# Patient Record
Sex: FEMALE | Race: BLACK OR AFRICAN AMERICAN | Employment: OTHER | URBAN - METROPOLITAN AREA
[De-identification: names, ages, dates, MRNs, and addresses within clinical notes are randomized per-mention and may not be internally consistent; named-entity substitution may affect disease eponyms.]

---

## 2022-10-07 ENCOUNTER — HOSPITAL ENCOUNTER (OUTPATIENT)
Dept: PHYSICAL THERAPY | Age: 77
Discharge: HOME | End: 2022-10-07
Payer: MEDICARE

## 2022-10-07 PROCEDURE — 97110 THERAPEUTIC EXERCISES: CPT

## 2022-10-07 PROCEDURE — 97161 PT EVAL LOW COMPLEX 20 MIN: CPT

## 2022-10-07 PROCEDURE — 97140 MANUAL THERAPY 1/> REGIONS: CPT

## 2022-10-07 NOTE — PROGRESS NOTES
[facility] at [address] [facility] [address] [facility], [facility][note 3] SOn license of [facility]  Phone: [phone]      Fax:  [phone]    Plan of Care/ Statement of Necessity for Physical Therapy Services  Patient name: Jose Carlos Dyer Start of Care: 10/7/2022   Referral source: Ambrose De La O MD : 1945    Medical Diagnosis: Left hand pain [M79.642]  Payor: [place] MEDICARE / Plan: [place] MEDICARE PART A & B / Product Type: Medicare /  Onset Date:22    Treatment Diagnosis: Left Hand Pain   Prior Hospitalization: see medical history Provider#: [id number]   Medications: Verified on Patient summary List   Comorbidities: Depression, OA, HTN  Prior Level of Function: Able to use the left arm with no trouble or pain         The Plan of Care and following information is based on the information from the initial evaluation. Assessment/ key information: Patient is a 68 y.o. female referred to PT with the above Dx. Patient seen today s/p left humerus fracture resulting in a left reverse total shoulder replacement on 22. Currently 8 weeks post op       Patient presents to PT with decreased strength, decreased flexibility, and decreased mobility of the left shoulder. Patient s/s appear to be consistent w/ diagnosis. Patient demonstrates the potential to make functional gains within a reasonable time frame and will benefit from skilled PT to address impairments and improve functional mobility and strength for an improved quality of life.   Fall Risk Assessment: Patient demonstrates a low Fall Risk       Evaluation Complexity History MEDIUM  Complexity : 1-2 comorbidities / personal factors will impact the outcome/ POC ; Examination LOW Complexity : 1-2 Standardized tests and measures addressing body structure, function, activity limitation and / or participation in recreation  ;Presentation LOW Complexity : Stable, uncomplicated  ;Clinical Decision Making MEDIUM Complexity : FOTO score of 26-74  Overall Complexity Rating: LOW   Problem List: pain affecting function, decrease ROM, decrease strength, decrease ADL/ functional abilitiies, decrease activity tolerance, decrease flexibility/ joint mobility, decrease transfer abilities, and other FOTO = 34    Treatment Plan may include any combination of the following: Therapeutic exercise, Therapeutic activities, Neuromuscular re-education, Physical agent/modality, Manual therapy, Patient education, Self Care training, Functional mobility training, and Home safety training  Patient / Family readiness to learn indicated by: asking questions, trying to perform skills, and interest  Persons(s) to be included in education: patient (P)  Barriers to Learning/Limitations: None  Patient Goal (s): To get better mobility  Patient Self Reported Health Status: good  Rehabilitation Potential: good    Short Term Goals: To be accomplished in 5 treatments:  1. Pt will be compliant and independent with HEP in order to facilitate PT sessions and aid with self management   Eval Status:  Initiated   Current Status:  2. Pt to tolerate 30 min or more of TE and/or Interventions w/o increased s/s   Eval Status:  Initiated   Current Status:    Long Term Goals: To be accomplished in 10 treatments:  1. Pt will report 50% improvement or better with left shoulder dysfunction to show a significant increase in ability to tolerate ADL   Eval Status:  Initiated   Current Status:  2. Pt will demonstrate PROM left shoulder Flx/ABD to 120* for good progress towards AAROM with little dificulty    Eval Status:  Flx 58 ABD 45   Current Status:  3. Pt will demonstrate AAROM with finger ladder to 120* for carryover to reaching her head to allow her to fix her hair with little difficulty   Eval Status:  Not able to fix her hair   Current Status:  4.   Pt will report the ability to pull up her pants with little difficulty to return to PLOF     Eval Status:  Hard to pull up her pants without assistance   Current Status:  5. Pt will improve FOTO score to 57 to show significant improvement for progress to good left shoulder function   Eval Status: FOTO = 34   Current Status:   Frequency / Duration: Patient to be seen 2-3 times per week for 10 treatments. Patient/ Caregiver education and instruction: Diagnosis, prognosis, self care, activity modification, and exercises   [x]  Plan of care has been reviewed with PTA    Certification Period: 10/7/22 - 11/5/22  Shay Monte, PT 10/7/2022 7:18 AM  _____________________________________________________________________  I certify that the above Therapy Services are being furnished while the patient is under my care. I agree with the treatment plan and certify that this therapy is necessary.     [de-identified] Signature:____________Date:_________TIME:________     Adam Lawrence MD  ** Signature, Date and Time must be completed for valid certification **    Please sign and return to [facility] at [address] [facility].[place] [address] [place] [address], [facility] [address]  Phone: [phone]      Fax:  [phone]

## 2022-10-07 NOTE — PROGRESS NOTES
PT DAILY TREATMENT NOTE/SHOULDER EVAL     Patient Name: Gina Chavarria  Date:10/7/2022  : 1945  [x]  Patient  Verified  Payor: VA MEDICARE / Plan: VA MEDICARE PART A & B / Product Type: Medicare /    In time:1115  Out time:1200  Total Treatment Time (min): 45  Visit #: 1 of 10    Medicare/BCBS Only   Total Timed Codes (min):  25 1:1 Treatment Time:  45       Treatment Area: Left hand pain [M79.642]      SUBJECTIVE  Pain Level (0-10 scale): 2  []constant []intermittent []improving []worsening []no change since onset     Any medication changes, allergies to medications, adverse drug reactions, diagnosis change, or new procedure performed?: [x] No    [] Yes (see summary sheet for update)  Subjective functional status/changes:       Subjective: Patient is a 68 y.o. female referred to PT with the above Dx. Patient seen today s/p left humerus fracture resulting in a left reverse total shoulder replacement on 22. Currently 8 weeks post op      Patient presents to PT with decreased strength, decreased flexibility, and decreased mobility of the left shoulder. Patient s/s appear to be consistent w/ diagnosis. Patient demonstrates the potential to make functional gains within a reasonable time frame and will benefit from skilled PT to address impairments and improve functional mobility and strength for an improved quality of life. Fall Risk Assessment: Patient demonstrates a low Fall Risk      Mechanism of Injury: Fall and fracture left proximal humerus, 22    Comorbidities: Depression, OA, HTN  Prior Level of Function: Able to use the left arm with no trouble or pain    FOTO: 34 / 57 in 13 visits    Goal: To get better mobility    Health Status: Good      What type of work do you do? N/A    Living Situation/Domestic Life: Lives at home with  and son  Mobility: Mod (I)  Self Care Mod (I)    What type of daily activities/hobbies?  House work, take care of     Limitations to Activity/Recreation/PLOF: Hard to pull up pants, not able to cook         Barriers: []pain []financial []time []transportation []other    Motivation: High    FABQ Score: []low []elevate    Cognition: A & O x 3    Other:    Risk For Falls:   [x]No  []low []elevate           OBJECTIVE/EXAMINATION  Posture: Fwd Head and Fwd Shoulders, Increased Kyphosis,   - Decreased left shoulder mobility/function  - Pain at end range  - TTP with muscle tightness left pec/infraspinatus         AROM PROM Strength Pain? ?    Right Left Right Left Right Left    Cx Rot          Cx SB          Cx Flx          Cx Ext          Shoulder Flx WNL 58        Shoulder Ext WNL 52        Shoulder ABD WNL 45        Shoulder ADD WNL NT        Shoulder IR          Shoulder ER  27                        20 min [x]Eval                  []Re-Eval         15 min Therapeutic Exercise:  [x] See flow sheet : Develop and instruct HEP   Rationale: increase ROM, increase strength, and improve coordination to improve the patients ability to Initiate HEP and improve daily function     10 min Manual Therapy:  PROM with slight overstretch left shoulder Flx/ABD, STM/DTM Left Pec/infraspinatus   The manual therapy interventions were performed at a separate and distinct time from the therapeutic activities interventions. Rationale: decrease pain, increase ROM, increase tissue extensibility, and decrease trigger points to improve daily function                                                                   With   [x] TE   [] TA   [] neuro   [] other: Patient Education: [x] Review HEP    [] Progressed/Changed HEP based on:   [] positioning   [] body mechanics   [] transfers   [] heat/ice application    [] other:       Other Objective/Functional Measures:      Access Code: [email or web address]  URL: [email or web address]  Date: 10/07/2022  Prepared by: Josh Park    Exercises  Seated Straight Fist AROM - 2 x daily - 7 x weekly - 3 sets - 10 reps  Seated Wrist Extension AROM - 2 x daily - 7 x weekly - 3 sets - 10 reps  Seated Forearm Pronation and Supination AROM - 2 x daily - 7 x weekly - 3 sets - 10 reps  Seated Elbow Flexion and Extension AROM - 2 x daily - 7 x weekly - 3 sets - 10 reps  Seated Cervical Sidebending AROM - 2 x daily - 7 x weekly - 10 reps  Seated Scapular Retraction - 2 x daily - 7 x weekly - 3 sets - 10 reps  Seated Shoulder Shrugs - 2 x daily - 7 x weekly - 3 sets - 10 reps  Seated Shoulder Shrug Circles AROM Backward - 2 x daily - 7 x weekly - 3 sets - 10 reps    Pain Level (0-10 scale) post treatment: 2     ASSESSMENT/Changes in Function:    - Pt demo understanding of the HEP      [x]  See Plan of Care  []  See progress note/recertification  []  See Discharge Summary         Progress towards goals / Updated goals:  Short Term Goals: To be accomplished in 5 treatments:  1. Pt will be compliant and independent with HEP in order to facilitate PT sessions and aid with self management   Eval Status:  Initiated   Current Status:  2. Pt to tolerate 30 min or more of TE and/or Interventions w/o increased s/s   Eval Status:  Initiated   Current Status:    Long Term Goals: To be accomplished in 10 treatments:  1. Pt will report 50% improvement or better with left shoulder dysfunction to show a significant increase in ability to tolerate ADL   Eval Status:  Initiated   Current Status:  2. Pt will demonstrate PROM left shoulder Flx/ABD to 120* for good progress towards AAROM with little dificulty    Eval Status:  Flx 58 ABD 45   Current Status:  3. Pt will demonstrate AAROM with finger ladder to 120* for carryover to reaching her head to allow her to fix her hair with little difficulty   Eval Status:  Not able to fix her hair   Current Status:  4. Pt will report the ability to pull up her pants with little difficulty to return to PLOF     Eval Status:  Hard to pull up her pants without assistance   Current Status:  5.   Pt will improve FOTO score to 57 to show significant improvement for progress to good left shoulder function   Eval Status: FOTO = 34   Current Status:      PLAN  [x]  Upgrade activities as tolerated     [x]  Continue plan of care  []  Update interventions per flow sheet       []  Discharge due to:_  []  Other:_       Cesar Alcantara, PT 10/7/2022  7:20 AM

## 2022-10-11 ENCOUNTER — HOSPITAL ENCOUNTER (OUTPATIENT)
Dept: PHYSICAL THERAPY | Age: 77
Discharge: HOME | End: 2022-10-11
Payer: MEDICARE

## 2022-10-11 PROCEDURE — 97140 MANUAL THERAPY 1/> REGIONS: CPT

## 2022-10-11 PROCEDURE — 97530 THERAPEUTIC ACTIVITIES: CPT

## 2022-10-11 PROCEDURE — 97110 THERAPEUTIC EXERCISES: CPT

## 2022-10-11 NOTE — PROGRESS NOTES
PT DAILY TREATMENT NOTE     Patient Name: Aundrea Kat  Date:10/11/2022  : 1945  [x]  Patient  Verified  Payor: VA MEDICARE / Plan: VA MEDICARE PART A & B / Product Type: Medicare /    In time:929  Out time:1020  Total Treatment Time (min): 51  Visit #: 2 of 10    Medicare/BCBS Only   Total Timed Codes (min):  41 1:1 Treatment Time:  41       Treatment Area: Left hand pain [M79.362]    SUBJECTIVE  Pain Level (0-10 scale): 4/10  Any medication changes, allergies to medications, adverse drug reactions, diagnosis change, or new procedure performed?: [x] No    [] Yes (see summary sheet for update)  Subjective functional status/changes:   [] No changes reported  Reports increased \"I think its the weather\"    OBJECTIVE    Modality rationale: decrease inflammation and decrease pain to improve the patients ability to perform ADLs   Min Type Additional Details    [] Estim:  []Unatt       []IFC  []Premod                        []Other:  []w/ice   []w/heat  Position:  Location:    [] Estim: []Att    []TENS instruct  []NMES                    []Other:  []w/US   []w/ice   []w/heat  Position:  Location:    []  Traction: [] Cervical       []Lumbar                       [] Prone          []Supine                       []Intermittent   []Continuous Lbs:  [] before manual  [] after manual    []  Ultrasound: []Continuous   [] Pulsed                           []1MHz   []3MHz W/cm2:  Location:    []  Iontophoresis with dexamethasone         Location: [] Take home patch   [] In clinic   10 []  Ice     [x]  heat  []  Ice massage  []  Laser   []  Anodyne Position: Long sitting   Location: left shoulder     []  Laser with stim  []  Other:  Position:  Location:    []  Vasopneumatic Device    []  Right     []  Left  Pre-treatment girth:  Post-treatment girth:  Measured at (location):  Pressure:       [] lo [] med [] hi   Temperature: [] lo [] med [] hi   [x] Skin assessment post-treatment:  [x]intact []redness- no adverse reaction    []redness - adverse reaction:         12 min Therapeutic Exercise:  [] See flow sheet :   Rationale: increase ROM and increase strength to improve the patients ability to perform ADLs    14 min Therapeutic Activity:  []  See flow sheet :   Rationale: increase ROM, increase strength, and improve coordination  to improve the patients ability to perform ADLs       15 min Manual Therapy:  STM/TPR post delt/triceps, gentle PROM, densensitization rub    The manual therapy interventions were performed at a separate and distinct time from the therapeutic activities interventions. Rationale: decrease pain, increase ROM, increase tissue extensibility, and decrease trigger points to perform ADLs          With   [x] TE   [] TA   [] neuro   [] other: Patient Education: [x] Review HEP    [x] Progressed/Changed HEP based on:   [] positioning   [] body mechanics   [] transfers   [] heat/ice application    [x] other: Densensitization  rub      Other Objective/Functional Measures:   - initiate TE per flow sheet      Pain Level (0-10 scale) post treatment: 2/10    ASSESSMENT/Changes in Function: patient actively participates in therapy. Decreased activity tolerance d/t pain. However, reports decreased pain after MT     Patient will continue to benefit from skilled PT services to modify and progress therapeutic interventions, address functional mobility deficits, address ROM deficits, address strength deficits, analyze and address soft tissue restrictions, and analyze and cue movement patterns to attain remaining goals. []  See Plan of Care  []  See progress note/recertification  []  See Discharge Summary         Progress towards goals / Updated goals:  Short Term Goals: To be accomplished in 5 treatments:  1.   Pt will be compliant and independent with HEP in order to facilitate PT sessions and aid with self management             Eval Status:  Initiated             Current Status: Met, patient compliant with HEP 10/11/22  2. Pt to tolerate 30 min or more of TE and/or Interventions w/o increased s/s             Eval Status:  Initiated             Current Status:     Long Term Goals: To be accomplished in 10 treatments:  1. Pt will report 50% improvement or better with left shoulder dysfunction to show a significant increase in ability to tolerate ADL             Eval Status:  Initiated             Current Status:  2. Pt will demonstrate PROM left shoulder Flx/ABD to 120* for good progress towards AAROM with little dificulty              Eval Status:  Flx 58 ABD 45             Current Status:  3. Pt will demonstrate AAROM with finger ladder to 120* for carryover to reaching her head to allow her to fix her hair with little difficulty             Eval Status:  Not able to fix her hair             Current Status:  4. Pt will report the ability to pull up her pants with little difficulty to return to PLOF               Eval Status:  Hard to pull up her pants without assistance             Current Status:  5.   Pt will improve FOTO score to 57 to show significant improvement for progress to good left shoulder function             Eval Status: FOTO = 34             Current Status:     PLAN  [x]  Upgrade activities as tolerated     [x]  Continue plan of care  []  Update interventions per flow sheet       []  Discharge due to:_  []  Other:_      Hansa Lay PTA 10/11/2022  9:33 AM    Future Appointments   Date Time Provider Pierce Wahl   10/14/2022  2:45 PM Radha Clay PTA [facility]   10/17/2022 12:30 PM Radha Clay PTA [facility]   10/19/2022 11:00 AM Radha Clay PTA [facility]   10/21/2022 10:15 AM Radha Clay PTA [facility]   10/24/2022 11:00 AM Radha Clay PTA [facility]   10/26/2022 10:15 AM Sarah Xavier, PT [facility]   10/28/2022  8:00 AM Sarah Xavier PT [facility]   10/31/2022  3:30 PM Sarah Xavier PT [facility]   11/2/2022 11:45 AM Sarah Xavier, PT [id number] [facility]

## 2022-10-14 ENCOUNTER — APPOINTMENT (OUTPATIENT)
Dept: PHYSICAL THERAPY | Age: 77
End: 2022-10-14
Payer: MEDICARE

## 2022-10-17 ENCOUNTER — HOSPITAL ENCOUNTER (OUTPATIENT)
Dept: PHYSICAL THERAPY | Age: 77
Discharge: HOME | End: 2022-10-17
Payer: MEDICARE

## 2022-10-17 PROCEDURE — 97140 MANUAL THERAPY 1/> REGIONS: CPT

## 2022-10-17 PROCEDURE — 97530 THERAPEUTIC ACTIVITIES: CPT

## 2022-10-17 PROCEDURE — 97110 THERAPEUTIC EXERCISES: CPT

## 2022-10-17 NOTE — PROGRESS NOTES
PT DAILY TREATMENT NOTE     Patient Name: Betito Bragg  Date:10/17/2022  : 1945  [x]  Patient  Verified  Payor: [place] MEDICARE / Plan: [place] MEDICARE PART A & B / Product Type: Medicare /    In time:1228  Out time:1226  Total Treatment Time (min): 58  Visit #: 3 of 10    Medicare/BCBS Only   Total Timed Codes (min):  48 1:1 Treatment Time:  48       Treatment Area: Left hand pain [M79.642]    SUBJECTIVE  Pain Level (0-10 scale): 2/10  Any medication changes, allergies to medications, adverse drug reactions, diagnosis change, or new procedure performed?: [x] No    [] Yes (see summary sheet for update)  Subjective functional status/changes:   [] No changes reported  Reports decreased pain compared to last visit.  Says pain range is 0-2/10     OBJECTIVE           Modality rationale: decrease inflammation and decrease pain to improve the patients ability to perform ADLs    Min Type Additional Details     [] Estim:  []Unatt       []IFC  []Premod                        []Other:  []w/ice   []w/heat  Position:  Location:     [] Estim: []Att    []TENS instruct  []NMES                    []Other:  []w/US   []w/ice   []w/heat  Position:  Location:     []  Traction: [] Cervical       []Lumbar                       [] Prone          []Supine                       []Intermittent   []Continuous Lbs:  [] before manual  [] after manual     []  Ultrasound: []Continuous   [] Pulsed                           []1MHz   []3MHz W/cm2:  Location:     []  Iontophoresis with dexamethasone         Location: [] Take home patch   [] In clinic   10 []  Ice     [x]  heat  []  Ice massage  []  Laser   []  Anodyne Position: Long sitting   Location: left shoulder      []  Laser with stim  []  Other:  Position:  Location:     []  Vasopneumatic Device    []  Right     []  Left  Pre-treatment girth:  Post-treatment girth:  Measured at (location):  Pressure:       [] lo [] med [] hi   Temperature: [] lo [] med [] hi   [x] Skin assessment post-treatment:  [x]intact []redness- no adverse reaction    []redness - adverse reaction:       18 min Therapeutic Exercise:  [] See flow sheet :   Rationale: increase ROM and increase strength to improve the patients ability to perform ADLs    15 min Therapeutic Activity:  []  See flow sheet :   Rationale: increase ROM, increase strength, and improve coordination  to improve the patients ability to perform ADLs       15 min Manual Therapy:  PROM with gentle OP flx/abd, STM/TPR mid/ant delt and biceps tendon area    The manual therapy interventions were performed at a separate and distinct time from the therapeutic activities interventions. Rationale: decrease pain, increase ROM, increase tissue extensibility, and decrease trigger points to perform ADLs            With   [x] TE   [] TA   [] neuro   [] other: Patient Education: [x] Review HEP    [] Progressed/Changed HEP based on:   [] positioning   [] body mechanics   [] transfers   [] heat/ice application    [] other:      Other Objective/Functional Measures:   - initiated closed chain codmans   - PROM shoulder flx 89* ABD 60*      Pain Level (0-10 scale) post treatment: 3    ASSESSMENT/Changes in Function: patient actively participates in therapy with minor c/o discomfort with codmans and MT     Patient will continue to benefit from skilled PT services to modify and progress therapeutic interventions, address functional mobility deficits, address ROM deficits, address strength deficits, analyze and address soft tissue restrictions, and analyze and cue movement patterns to attain remaining goals. []  See Plan of Care  []  See progress note/recertification  []  See Discharge Summary         Progress towards goals / Updated goals:  Short Term Goals: To be accomplished in 5 treatments:  1.   Pt will be compliant and independent with HEP in order to facilitate PT sessions and aid with self management             Eval Status:  Initiated             Current Status: Met, patient compliant with HEP 10/11/22  2. Pt to tolerate 30 min or more of TE and/or Interventions w/o increased s/s             Eval Status:  Initiated             Current Status:     Long Term Goals: To be accomplished in 10 treatments:  1. Pt will report 50% improvement or better with left shoulder dysfunction to show a significant increase in ability to tolerate ADL             Eval Status:  Initiated             Current Status:  2. Pt will demonstrate PROM left shoulder Flx/ABD to 120* for good progress towards AAROM with little dificulty              Eval Status:  Flx 58 ABD 45             Current Status: progressing flx 89* ABD 60* 10/17/22  3. Pt will demonstrate AAROM with finger ladder to 120* for carryover to reaching her head to allow her to fix her hair with little difficulty             Eval Status:  Not able to fix her hair             Current Status:  4. Pt will report the ability to pull up her pants with little difficulty to return to PLOF               Eval Status:  Hard to pull up her pants without assistance             Current Status:  5.   Pt will improve FOTO score to 57 to show significant improvement for progress to good left shoulder function             Eval Status: FOTO = 34             Current Status:     PLAN  [x]  Upgrade activities as tolerated     [x]  Continue plan of care  []  Update interventions per flow sheet       []  Discharge due to:_  []  Other:_      Yobany Dowling PTA 10/17/2022  12:33 PM    Future Appointments   Date Time Provider Pierce Wahl   10/19/2022 11:00 AM Susanna Bonilla PTA [facility]   10/21/2022 10:15 AM Susanna Bonilla PTA [facility]   10/24/2022 11:00 AM Susanna Bonilla PTA [facility]   10/26/2022 10:15 AM Jacklyn Rust, PT [facility]   10/28/2022  8:00 AM Jacklyn Rust, PT [facility]   10/31/2022  3:30 PM Jacklyn Rust, PT [facility]   11/2/2022 11:45 AM Jacklyn Rust, PT [facility]

## 2022-10-19 ENCOUNTER — HOSPITAL ENCOUNTER (OUTPATIENT)
Dept: PHYSICAL THERAPY | Age: 77
Discharge: HOME | End: 2022-10-19
Payer: MEDICARE

## 2022-10-19 PROCEDURE — 97110 THERAPEUTIC EXERCISES: CPT

## 2022-10-19 PROCEDURE — 97530 THERAPEUTIC ACTIVITIES: CPT

## 2022-10-19 PROCEDURE — 97140 MANUAL THERAPY 1/> REGIONS: CPT

## 2022-10-19 NOTE — PROGRESS NOTES
PT DAILY TREATMENT NOTE     Patient Name: David Runnels  Date:10/19/2022  : 1945  [x]  Patient  Verified  Payor: [place] MEDICARE / Plan: [place] MEDICARE PART A & B / Product Type: Medicare /    In time:1058  Out time:1148  Total Treatment Time (min): 50  Visit #: 4 of 10    Medicare/BCBS Only   Total Timed Codes (min):  40 1:1 Treatment Time:  40       Treatment Area: Left hand pain [M79.642]    SUBJECTIVE  Pain Level (0-10 scale): 5-6/10  Any medication changes, allergies to medications, adverse drug reactions, diagnosis change, or new procedure performed?: [x] No    [] Yes (see summary sheet for update)  Subjective functional status/changes:   [] No changes reported  Reports increased pain \"this weather is killing me\"     OBJECTIVE               Modality rationale: decrease inflammation and decrease pain to improve the patients ability to perform ADLs     Min Type Additional Details      [] Estim:  []Unatt       []IFC  []Premod                        []Other:  []w/ice   []w/heat  Position:  Location:      [] Estim: []Att    []TENS instruct  []NMES                    []Other:  []w/US   []w/ice   []w/heat  Position:  Location:      []  Traction: [] Cervical       []Lumbar                       [] Prone          []Supine                       []Intermittent   []Continuous Lbs:  [] before manual  [] after manual      []  Ultrasound: []Continuous   [] Pulsed                           []1MHz   []3MHz W/cm2:  Location:      []  Iontophoresis with dexamethasone         Location: [] Take home patch   [] In clinic    10 []  Ice     [x]  heat  []  Ice massage  []  Laser   []  Anodyne Position: Long sitting   Location: left shoulder       []  Laser with stim  []  Other:  Position:  Location:      []  Vasopneumatic Device    []  Right     []  Left  Pre-treatment girth:  Post-treatment girth:  Measured at (location):  Pressure:       [] lo [] med [] hi   Temperature: [] lo [] med [] hi    [x] Skin assessment post-treatment:  [x]intact []redness- no adverse reaction    []redness - adverse reaction:       10 min Therapeutic Exercise:  [] See flow sheet :   Rationale: increase ROM and increase strength to improve the patients ability to perform ADLs    20 min Therapeutic Activity:  []  See flow sheet :   Rationale: increase ROM, increase strength, and improve coordination  to improve the patients ability to perform ADLs       10 min Manual Therapy:  effleurage left arm/shoulder, STM left pec    The manual therapy interventions were performed at a separate and distinct time from the therapeutic activities interventions. Rationale: decrease pain, increase ROM, increase tissue extensibility, and decrease trigger points to perform ADLs            With   [x] TE   [] TA   [] neuro   [] other: Patient Education: [x] Review HEP    [] Progressed/Changed HEP based on:   [] positioning   [] body mechanics   [] transfers   [] heat/ice application    [] other:      Other Objective/Functional Measures:   - Increased elbow and wrist AROM to 3x10      Pain Level (0-10 scale) post treatment: 2-3/10    ASSESSMENT/Changes in Function: Patient actively participates in therapy and tolerates treatment well. Increased swelling today with some added pain but able to decreased with MT     Patient will continue to benefit from skilled PT services to modify and progress therapeutic interventions, address functional mobility deficits, address ROM deficits, address strength deficits, analyze and address soft tissue restrictions, and analyze and cue movement patterns to attain remaining goals. []  See Plan of Care  []  See progress note/recertification  []  See Discharge Summary         Progress towards goals / Updated goals:  Short Term Goals: To be accomplished in 5 treatments:  1.   Pt will be compliant and independent with HEP in order to facilitate PT sessions and aid with self management             Eval Status:  Initiated Current Status: Met, patient compliant with HEP 10/11/22  2. Pt to tolerate 30 min or more of TE and/or Interventions w/o increased s/s             Eval Status:  Initiated             Current Status:     Long Term Goals: To be accomplished in 10 treatments:  1. Pt will report 50% improvement or better with left shoulder dysfunction to show a significant increase in ability to tolerate ADL             Eval Status:  Initiated             Current Status:  2. Pt will demonstrate PROM left shoulder Flx/ABD to 120* for good progress towards AAROM with little dificulty              Eval Status:  Flx 58 ABD 45             Current Status: progressing flx 89* ABD 60* 10/17/22  3. Pt will demonstrate AAROM with finger ladder to 120* for carryover to reaching her head to allow her to fix her hair with little difficulty             Eval Status:  Not able to fix her hair             Current Status:  4. Pt will report the ability to pull up her pants with little difficulty to return to PLOF               Eval Status:  Hard to pull up her pants without assistance             Current Status:  5.   Pt will improve FOTO score to 57 to show significant improvement for progress to good left shoulder function             Eval Status: FOTO = 34             Current Status:     PLAN  [x]  Upgrade activities as tolerated     [x]  Continue plan of care  []  Update interventions per flow sheet       []  Discharge due to:_  []  Other:_      Ji Yeung PTA 10/19/2022  11:07 AM    Future Appointments   Date Time Provider Pierce Wahl   10/21/2022 10:15 AM Ori Eldridge PTA [facility]   10/24/2022 11:00 AM Ori Eldrdige PTA [facility]   10/26/2022 10:15 AM Buddy Howard, PT [facility]   10/28/2022  8:00 AM Buddy Howard, PT [facility]   10/31/2022  3:30 PM Buddy Howard, PT [facility]   11/2/2022 11:45 AM Buddy oHward, PT [facility]

## 2022-10-21 ENCOUNTER — APPOINTMENT (OUTPATIENT)
Dept: PHYSICAL THERAPY | Age: 77
End: 2022-10-21
Payer: MEDICARE

## 2022-10-24 ENCOUNTER — HOSPITAL ENCOUNTER (OUTPATIENT)
Dept: PHYSICAL THERAPY | Age: 77
Discharge: HOME | End: 2022-10-24
Payer: MEDICARE

## 2022-10-24 PROCEDURE — 97140 MANUAL THERAPY 1/> REGIONS: CPT

## 2022-10-24 PROCEDURE — 97110 THERAPEUTIC EXERCISES: CPT

## 2022-10-24 NOTE — PROGRESS NOTES
PT DAILY TREATMENT NOTE     Patient Name: Marito Robledo  Date:10/24/2022  : 1945  [x]  Patient  Verified  Payor: [place] MEDICARE / Plan: [place] MEDICARE PART A & B / Product Type: Medicare /    In time:1105  Out time:1146  Total Treatment Time (min): 41  Visit #: 5 of 10    Medicare/BCBS Only   Total Timed Codes (min):  33 1:1 Treatment Time:  33       Treatment Area: Left hand pain [M79.642]    SUBJECTIVE  Pain Level (0-10 scale): 3/10  Any medication changes, allergies to medications, adverse drug reactions, diagnosis change, or new procedure performed?: [x] No    [] Yes (see summary sheet for update)  Subjective functional status/changes:   [] No changes reported  Reports decreased pain that's intermittent.  Requests shortened treatment time d/t appointment at [facility]      OBJECTIVE    Modality rationale: decrease inflammation and decrease pain to improve the patients ability to perform ADLs   Min Type Additional Details    [] Estim:  []Unatt       []IFC  []Premod                        []Other:  []w/ice   []w/heat  Position:  Location:    [] Estim: []Att    []TENS instruct  []NMES                    []Other:  []w/US   []w/ice   []w/heat  Position:  Location:    []  Traction: [] Cervical       []Lumbar                       [] Prone          []Supine                       []Intermittent   []Continuous Lbs:  [] before manual  [] after manual    []  Ultrasound: []Continuous   [] Pulsed                           []1MHz   []3MHz W/cm2:  Location:    []  Iontophoresis with dexamethasone         Location: [] Take home patch   [] In clinic   8 []  Ice     [x]  heat  []  Ice massage  []  Laser   []  Anodyne Position: Long sitting   Location: left shoulder     []  Laser with stim  []  Other:  Position:  Location:    []  Vasopneumatic Device    []  Right     []  Left  Pre-treatment girth:  Post-treatment girth:  Measured at (location):  Pressure:       [] lo [] med [] hi   Temperature: [] lo [] med [] hi [] Skin assessment post-treatment:  []intact []redness- no adverse reaction    []redness - adverse reaction:     18 min Therapeutic Exercise:  [x] See flow sheet :   Rationale: increase ROM and increase strength to improve the patients ability to perform ADLs      15 min Manual Therapy:  STM/DTM/TPR let UT/levator    The manual therapy interventions were performed at a separate and distinct time from the therapeutic activities interventions. Rationale: decrease pain, increase ROM, increase tissue extensibility, and decrease trigger points to perform ADLs            With   [x] TE   [] TA   [] neuro   [] other: Patient Education: [x] Review HEP    [] Progressed/Changed HEP based on:   [] positioning   [] body mechanics   [] transfers   [] heat/ice application    [] other:      Other Objective/Functional Measures: TTP with tightness in the left UT      Pain Level (0-10 scale) post treatment: 1/10     ASSESSMENT/Changes in Function: Patient actively participates in therapy and tolerates treatment well with reports of decreased pain at the end of treatment. Shortened tx time today per patient request for another appointment     Patient will continue to benefit from skilled PT services to modify and progress therapeutic interventions, address functional mobility deficits, address ROM deficits, address strength deficits, analyze and address soft tissue restrictions, and analyze and cue movement patterns to attain remaining goals. []  See Plan of Care  []  See progress note/recertification  []  See Discharge Summary         Progress towards goals / Updated goals:  Short Term Goals: To be accomplished in 5 treatments:  1. Pt will be compliant and independent with HEP in order to facilitate PT sessions and aid with self management             Eval Status:  Initiated             Current Status: Met, patient compliant with HEP 10/11/22  2.   Pt to tolerate 30 min or more of TE and/or Interventions w/o increased s/s Eval Status:  Initiated             Current Status:     Long Term Goals: To be accomplished in 10 treatments:  1. Pt will report 50% improvement or better with left shoulder dysfunction to show a significant increase in ability to tolerate ADL             Eval Status:  Initiated             Current Status:  2. Pt will demonstrate PROM left shoulder Flx/ABD to 120* for good progress towards AAROM with little dificulty              Eval Status:  Flx 58 ABD 45             Current Status: progressing flx 89* ABD 60* 10/17/22  3. Pt will demonstrate AAROM with finger ladder to 120* for carryover to reaching her head to allow her to fix her hair with little difficulty             Eval Status:  Not able to fix her hair             Current Status:  4. Pt will report the ability to pull up her pants with little difficulty to return to PLOF               Eval Status:  Hard to pull up her pants without assistance             Current Status:  5.   Pt will improve FOTO score to 57 to show significant improvement for progress to good left shoulder function             Eval Status: FOTO = 34             Current Status:     PLAN  [x]  Upgrade activities as tolerated     [x]  Continue plan of care  []  Update interventions per flow sheet       []  Discharge due to:_  []  Other:_      Towana Camera, PTA 10/24/2022  11:15 AM    Future Appointments   Date Time Provider Pierce Wahl   10/26/2022 10:15 AM Julian Counter, PT [facility]   10/28/2022  8:00 AM Julian Counter, PT [facility]   10/31/2022  3:30 PM Julian Counter, PT [facility]   11/2/2022 11:45 AM Julian Counter, PT [facility]

## 2022-10-26 ENCOUNTER — APPOINTMENT (OUTPATIENT)
Dept: PHYSICAL THERAPY | Age: 77
End: 2022-10-26
Payer: MEDICARE

## 2022-10-28 ENCOUNTER — APPOINTMENT (OUTPATIENT)
Dept: PHYSICAL THERAPY | Age: 77
End: 2022-10-28
Payer: MEDICARE

## 2022-10-31 ENCOUNTER — HOSPITAL ENCOUNTER (OUTPATIENT)
Dept: PHYSICAL THERAPY | Age: 77
Discharge: HOME | End: 2022-10-31
Payer: MEDICARE

## 2022-10-31 PROCEDURE — 97110 THERAPEUTIC EXERCISES: CPT

## 2022-10-31 PROCEDURE — 97530 THERAPEUTIC ACTIVITIES: CPT

## 2022-10-31 PROCEDURE — 97140 MANUAL THERAPY 1/> REGIONS: CPT

## 2022-10-31 NOTE — PROGRESS NOTES
PT DAILY TREATMENT NOTE     Patient Name: Shireen Kilpatrick  GLBD:  : 1945  [x]  Patient  Verified  Payor: Mary Mack / Plan: [place] MED[place]ARE PART [place] & B / Product Type: Medicare /    In time:320  Out time: 418  Total Treatment Time (min): 58  Visit #: 6 of 10    Medicare/BCBS Only   Total Timed Codes (min):  53 1:1 Treatment Time:  53       Treatment Area: Left hand pain [M79.642]    SUBJECTIVE  Pain Level (0-10 scale): 1-2/10  Any medication changes, allergies to medications, adverse drug reactions, diagnosis change, or new procedure performed?: [x] No    [] Yes (see summary sheet for update)  Subjective functional status/changes:   [] No changes reported  Reports decreased pain. LOB upon entering clinic, patient able to recover without falling. Reports feeling light headed. BP in seated 120/62 mmHg. \"That was scary.  That's never happened before\"     OBJECTIVE         Modality rationale: decrease inflammation and decrease pain to improve the patients ability to perform ADLs    Min Type Additional Details     [] Estim:  []Unatt       []IFC  []Premod                        []Other:  []w/ice   []w/heat  Position:  Location:     [] Estim: []Att    []TENS instruct  []NMES                    []Other:  []w/US   []w/ice   []w/heat  Position:  Location:     []  Traction: [] Cervical       []Lumbar                       [] Prone          []Supine                       []Intermittent   []Continuous Lbs:  [] before manual  [] after manual     []  Ultrasound: []Continuous   [] Pulsed                           []1MHz   []3MHz W/cm2:  Location:     []  Iontophoresis with dexamethasone         Location: [] Take home patch   [] In clinic   5 []  Ice     [x]  heat  []  Ice massage  []  Laser   []  Anodyne Position: Long sitting   Location: left shoulder      []  Laser with stim  []  Other:  Position:  Location:     []  Vasopneumatic Device    []  Right     []  Left  Pre-treatment girth:  Post-treatment girth: Measured at (location):  Pressure:       [] lo [] med [] hi   Temperature: [] lo [] med [] hi   [x] Skin assessment post-treatment:  [x]intact [x]redness- no adverse reaction    []redness - adverse reaction:       15 min Therapeutic Exercise:  [x] See flow sheet :   Rationale: increase ROM and increase strength to improve the patients ability to perform ADLs    23 min Therapeutic Activity:  []  See flow sheet :   Rationale: increase ROM, increase strength, and improve coordination  to improve the patients ability to perform ADLs       15 min Manual Therapy:  PROM shoulder flx/abdution, STM left UT/post delt scar massage    The manual therapy interventions were performed at a separate and distinct time from the therapeutic activities interventions. Rationale: decrease pain, increase ROM, increase tissue extensibility, and decrease trigger points to perform ADLs            With   [x] TE   [] TA   [] neuro   [] other: Patient Education: [x] Review HEP    [] Progressed/Changed HEP based on:   [] positioning   [] body mechanics   [] transfers   [] heat/ice application    [] other:      Other Objective/Functional Measures: Hold off on Tumblr d/t LOB at the start of treatment. Patient remained seated throughout treatment. PROM left shoulder flx 101* abd 80*     Pain Level (0-10 scale) post treatment: 1/10    ASSESSMENT/Changes in Function: Patient actively participates in therapy and tolerates treatment well. Increased ROM with flx and abduction. Patient will continue to benefit from skilled PT services to modify and progress therapeutic interventions, address functional mobility deficits, address ROM deficits, address strength deficits, analyze and address soft tissue restrictions, and analyze and cue movement patterns to attain remaining goals.      []  See Plan of Care  []  See progress note/recertification  []  See Discharge Summary         Progress towards goals / Updated goals:  Short Term Goals: To be accomplished in 5 treatments:  1. Pt will be compliant and independent with HEP in order to facilitate PT sessions and aid with self management             Eval Status:  Initiated             Current Status: Met, patient compliant with HEP 10/11/22  2. Pt to tolerate 30 min or more of TE and/or Interventions w/o increased s/s             Eval Status:  Initiated             Current Status:     Long Term Goals: To be accomplished in 10 treatments:  1. Pt will report 50% improvement or better with left shoulder dysfunction to show a significant increase in ability to tolerate ADL             Eval Status:  Initiated             Current Status:  2. Pt will demonstrate PROM left shoulder Flx/ABD to 120* for good progress towards AAROM with little dificulty              Eval Status:  Flx 58 ABD 45             Current Status: progressing flx 101* abd 80* 10/31/22  3. Pt will demonstrate AAROM with finger ladder to 120* for carryover to reaching her head to allow her to fix her hair with little difficulty             Eval Status:  Not able to fix her hair             Current Status:  4. Pt will report the ability to pull up her pants with little difficulty to return to PLOF               Eval Status:  Hard to pull up her pants without assistance             Current Status:  5.   Pt will improve FOTO score to 57 to show significant improvement for progress to good left shoulder function             Eval Status: FOTO = 34             Current Status:        PLAN  [x]  Upgrade activities as tolerated     [x]  Continue plan of care  []  Update interventions per flow sheet       []  Discharge due to:_  []  Other:_      Cyndy Erickson PTA 10/31/2022  3:33 PM    Future Appointments   Date Time Provider Pierce Wahl   11/2/2022 11:45 AM Tra Recio, PT [place] [facility]

## 2022-11-02 ENCOUNTER — HOSPITAL ENCOUNTER (OUTPATIENT)
Dept: PHYSICAL THERAPY | Age: 77
Discharge: HOME | End: 2022-11-02
Payer: MEDICARE

## 2022-11-02 PROCEDURE — 97140 MANUAL THERAPY 1/> REGIONS: CPT

## 2022-11-02 PROCEDURE — 97035 APP MDLTY 1+ULTRASOUND EA 15: CPT

## 2022-11-02 PROCEDURE — 97110 THERAPEUTIC EXERCISES: CPT

## 2022-11-02 NOTE — PROGRESS NOTES
[facility] at [address] [facility] [facility], [address] [place] [address]  Phone: [phone]      Fax:  [phone]    Continued Plan of Care/ Re-certification for Physical Therapy Services    Patient name: Kathy Oneill Start of Care: 10/7/2022   Referral source: Alina Alex MD : 1945               Medical Diagnosis: Left hand pain [M79.642]  Payor: [place] MEDICARE / Plan: [place] MEDICARE PART A & B / Product Type: Medicare /  Onset Date:22               Treatment Diagnosis: Left Hand Pain   Prior Hospitalization: see medical history Provider#: [id number]   Medications: Verified on Patient summary List   Comorbidities: Depression, OA, HTN  Prior Level of Function: Able to use the left arm with no trouble or pain     Visits from Start of Care: 7    Missed Visits: 4    The Plan of Care and following information is based on the patient's current status:  Short Term Goals: To be accomplished in 5 treatments:  1. Pt will be compliant and independent with HEP in order to facilitate PT sessions and aid with self management             Eval Status:  Initiated             Current Status: Met, patient compliant with HEP 10/11/22  2. Pt to tolerate 30 min or more of TE and/or Interventions w/o increased s/s             Eval Status:  Initiated             Current Status:     Long Term Goals: To be accomplished in 10 treatments:  1. Pt will report 50% improvement or better with left shoulder dysfunction to show a significant increase in ability to tolerate ADL             Eval Status:  Initiated             Current Status:  Pt reports improvement but value is not provided 22  2. Pt will demonstrate PROM left shoulder Flx/ABD to 120* for good progress towards AAROM with little dificulty              Eval Status:  Flx 58 ABD 45             Current Status: progressing flx 101* abd 80* 10/31/22  3.   Pt will demonstrate AAROM with finger ladder to 120* for carryover to reaching her head to allow her to fix her hair with little difficulty             Eval Status:  Not able to fix her hair             Current Status: AAROM 110* after MT 11/2/22   4. Pt will report the ability to pull up her pants with little difficulty to return to PLOF               Eval Status:  Hard to pull up her pants without assistance             Current Status:  5. Pt will improve FOTO score to 57 to show significant improvement for progress to good left shoulder function             Eval Status: FOTO = 34             Current Status:    Key functional changes: Patient has shown good progress with this treatment program. Pain as of last visit was 3/10. Patient has shown decreased pain and increased strength and mobility. Patient reports improvement with overall involvement. Delay in treatment with 4 missed visits due to patient being sick and and for lack of transportation. Will progress with Sub max IR/ER, AAROM/AROM as appropriate. Current PROM 110* with at discomfort level and will continue to focus on PROM to 120*. All STG/LTGs achieved as identified below. Fall Risk Assessment: Patient demonstrates a low Fall Risk           Problems/ barriers to goal attainment: Non noted     Problem List: pain affecting function, decrease ROM, decrease strength, impaired gait/ balance, decrease ADL/ functional abilitiies, decrease activity tolerance, decrease flexibility/ joint mobility, and decrease transfer abilities    Treatment Plan: Therapeutic exercise, Neuromuscular reeducation, Manual therapy, Therapeutic activity, Self care/home management, Electric stim unattended , Ultrasound, Mechanical traction, Electric stim attended, Needle insertion w/o injection (1 or 2 muscles), and Needle insertion w/o injection (3+ muscles)     Patient Goal (s) has been updated and includes: \"Reach to her head better\"     Goals for this certification period to be accomplished in 10 treatments:  1.   Pt will report 50% improvement or better with left shoulder dysfunction to show a significant increase in ability to tolerate ADL              Status at last note/certification:  Pt reports improvement but value is not provided    Current Status:                 2.  Pt will demonstrate PROM left shoulder Flx/ABD to 120* for good progress towards AAROM with little dificulty               Status at last note/certification: Progressing flx 101* abd 80*    Current Status:              3.  Pt will demonstrate AAROM with finger ladder to 120* for carryover to reaching her head to allow her to fix her hair with little difficulty              Status at last note/certification: AAROM 110* after MT     Current Status:              4.  Pt will report the ability to pull up her pants with little difficulty to return to PLOF                Status at last note/certification:  Hard to pull up her pants without assistance    Current Status:              5.  Pt will improve FOTO score to 57 to show significant improvement for progress to good left shoulder function               Status at last note/certification: FOTO = 34   Current Status:               Frequency / Duration: Patient to be seen 2-3 times per week for 10 treatments:    Assessment / Recommendations:Patient is showing improved function with her current treatment program but it is not sustained to this point. Patient will continue to benefit from skilled PT to address impairments and continue to improve functional mobility and tolerance to daily activity. Certification Period: 11/7/22 - 12/6/22    Saima Arce, PT 11/2/2022 1:38 PM    ________________________________________________________________________  I certify that the above Therapy Services are being furnished while the patient is under my care. I agree with the treatment plan and certify that this therapy is necessary. [] I have read the above and request that my patient continue as recommended.   [] I have read the above report and request that my patient continue therapy with the following changes/special instructions: ______________________________________  [] I have read the above report and request that my patient be discharged from therapy    Physician's Signature:____________Date:_________TIME:________     Rachael Gary MD  ** Signature, Date and Time must be completed for valid certification **    Please sign and return to [facility] at [address] [facility].[address] Revol[address] j [address] [place] [address], [facility] [address] [facility] [address]  Phone: [phone]      Fax:  [phone]

## 2022-11-02 NOTE — PROGRESS NOTES
PT DAILY TREATMENT NOTE     Patient Name: David Clark  Date:2022  : 1945  [x]  Patient  Verified  Payor: [place] MEDICARE / Plan: [place] MEDICARE PART A & B / Product Type: Medicare /    In FDPY:7843  Out time:1245  Total Treatment Time (min): 60  Visit #: 7 of 10    Medicare/BCBS Only   Total Timed Codes (min):  60 1:1 Treatment Time:  60       Treatment Area: Left hand pain [M79.642]    SUBJECTIVE  Pain Level (0-10 scale): 3  Any medication changes, allergies to medications, adverse drug reactions, diagnosis change, or new procedure performed?: [x] No    [] Yes (see summary sheet for update)  Subjective functional status/changes:   [] No changes reported  A little stiff with pain at the front of the shoulder    OBJECTIVE    Modality rationale: decrease inflammation, decrease pain, and increase tissue extensibility to improve the patients ability to tolerate increased activity and therapy interventions   Min Type Additional Details    [] Estim:  []Unatt       []IFC  []Premod                        []Other:  []w/ice   []w/heat  Position:  Location:    [] Estim: []Att    []TENS instruct  []NMES                    []Other:  []w/US   []w/ice   []w/heat  Position:  Location:    []  Traction: [] Cervical       []Lumbar                       [] Prone          []Supine                       []Intermittent   []Continuous Lbs:  [] before manual  [] after manual   10 [x]  Ultrasound: [x]Continuous   [] Pulsed                           [x]1MHz   []3MHz W/cm2:1.7  Location: Left infraspinatus/UT/Lvtr    []  Iontophoresis with dexamethasone         Location: [] Take home patch   [] In clinic    []  Ice     []  heat  []  Ice massage  []  Laser   []  Anodyne Position:  Location:    []  Laser with stim  []  Other:  Position:  Location:    []  Vasopneumatic Device    []  Right     []  Left  Pre-treatment girth:  Post-treatment girth:  Measured at (location):  Pressure:       [] lo [] med [] hi   Temperature: [] lo [] med [] hi   [x] Skin assessment post-treatment:  [x]intact []redness- no adverse reaction    []redness - adverse reaction:         30 min Therapeutic Exercise:  [x] See flow sheet :   Rationale: increase ROM, increase strength, and improve coordination to improve the patients ability to perform increased activity    20 min Manual Therapy:  PROM/AAROM with slight overstretch, STM/DTM/TPR at the left infraspinatus, UT and Levator   The manual therapy interventions were performed at a separate and distinct time from the therapeutic activities interventions. Rationale: decrease pain, increase ROM, increase tissue extensibility, and decrease trigger points to improve active mobility              With   [x] TE   [] TA   [] neuro   [] other: Patient Education: [x] Review HEP    [] Progressed/Changed HEP based on:   [] positioning   [] body mechanics   [] transfers   [] heat/ice application    [] other:      Other Objective/Functional Measures:   - Initiate AROM in supine Scaption plane  - Initiate Flx Iso  - Add US after MT  - PROM/AAROM left Shoulder Scap 102*  - TTP with muscle tightness at the left infraspinatus and Levator muscles. Axillary nerve pattern pain with referred pain to the anterior and lateral shoulder with palpation of the infraspinatus     Pain Level (0-10 scale) post treatment: 2    ASSESSMENT/Changes in Function:   - AAROM Left Shoulder Flx to 110  - AROM Supine Shoulder Scaption 102*  - Decreased pain and improved mobility after MT    Patient has shown good progress with this treatment program. Pain as of last visit was 3/10. Patient has shown decreased pain and increased strength and mobility. Patient reports improvement with overall involvement. Delay in treatment with 4 missed visits due to patient being sick and and for lack of transportation. Will progress with Sub max IR/ER, AAROM/AROM as appropriate. Current PROM 110* with at discomfort level and will continue to focus on PROM to 120*. All STG/LTGs achieved as identified below. Fall Risk Assessment: Patient demonstrates a low Fall Risk      Patient will continue to benefit from skilled PT services to modify and progress therapeutic interventions, address functional mobility deficits, address ROM deficits, address strength deficits, analyze and address soft tissue restrictions, and analyze and cue movement patterns to attain remaining goals. []  See Plan of Care  []  See progress note/recertification  []  See Discharge Summary         Progress towards goals / Updated goals:  Short Term Goals: To be accomplished in 5 treatments:  1. Pt will be compliant and independent with HEP in order to facilitate PT sessions and aid with self management             Eval Status:  Initiated             Current Status: Met, patient compliant with HEP 10/11/22  2. Pt to tolerate 30 min or more of TE and/or Interventions w/o increased s/s             Eval Status:  Initiated             Current Status:     Long Term Goals: To be accomplished in 10 treatments:  1. Pt will report 50% improvement or better with left shoulder dysfunction to show a significant increase in ability to tolerate ADL             Eval Status:  Initiated             Current Status:  Pt reports improvement but value is not provided 11/2/22  2. Pt will demonstrate PROM left shoulder Flx/ABD to 120* for good progress towards AAROM with little dificulty              Eval Status:  Flx 58 ABD 45             Current Status: progressing flx 101* abd 80* 10/31/22  3. Pt will demonstrate AAROM with finger ladder to 120* for carryover to reaching her head to allow her to fix her hair with little difficulty             Eval Status:  Not able to fix her hair             Current Status: AAROM 110* after MT 11/2/22   4.   Pt will report the ability to pull up her pants with little difficulty to return to PLOF               Eval Status:  Hard to pull up her pants without assistance Current Status:  5.   Pt will improve FOTO score to 57 to show significant improvement for progress to good left shoulder function             Eval Status: FOTO = 34             Current Status:     PLAN  [x]  Upgrade activities as tolerated     [x]  Continue plan of care  []  Update interventions per flow sheet       []  Discharge due to:_  []  Other:_      Bishop Graves, PT 11/2/2022  8:42 AM    Future Appointments   Date Time Provider Pierce Wahl   11/2/2022 11:45 AM Samantha Collazo, PT [place] [facility]

## 2022-11-08 ENCOUNTER — HOSPITAL ENCOUNTER (OUTPATIENT)
Dept: PHYSICAL THERAPY | Age: 77
Discharge: HOME | End: 2022-11-08
Payer: MEDICARE

## 2022-11-08 PROCEDURE — 97140 MANUAL THERAPY 1/> REGIONS: CPT

## 2022-11-08 PROCEDURE — 97035 APP MDLTY 1+ULTRASOUND EA 15: CPT

## 2022-11-08 PROCEDURE — 97110 THERAPEUTIC EXERCISES: CPT

## 2022-11-08 NOTE — PROGRESS NOTES
PT DAILY TREATMENT NOTE     Patient Name: Becky Santiago  Date:2022  : 1945  [x]  Patient  Verified  Payor: [place] MEDICARE / Plan: VA MEDICARE PART A & B / Product Type: Medicare /    In time:2  Out time:1:25  Total Treatment Time (min): 48  Visit #: 1 of 10    Medicare/BCBS Only   Total Timed Codes (min):  53 1:1 Treatment Time:  53       Treatment Area: Left hand pain [M79.642]    SUBJECTIVE  Pain Level (0-10 scale): 0  Any medication changes, allergies to medications, adverse drug reactions, diagnosis change, or new procedure performed?: [x] No    [] Yes (see summary sheet for update)  Subjective functional status/changes:   [] No changes reported  Feeling better.   Able to turn the light on and off with the left hand    OBJECTIVE    Modality rationale: decrease inflammation, decrease pain, and increase tissue extensibility to improve the patients ability to tolerate increased activity and therapy interventions    Min Type Additional Details     [] Estim:  []Unatt       []IFC  []Premod                        []Other:  []w/ice   []w/heat  Position:  Location:     [] Estim: []Att    []TENS instruct  []NMES                    []Other:  []w/US   []w/ice   []w/heat  Position:  Location:     []  Traction: [] Cervical       []Lumbar                       [] Prone          []Supine                       []Intermittent   []Continuous Lbs:  [] before manual  [] after manual   8 [x]  Ultrasound: [x]Continuous   [] Pulsed                           [x]1MHz   []3MHz W/cm2:1.7  Location: Left infraspinatus/Post Delt and tricep     []  Iontophoresis with dexamethasone         Location: [] Take home patch   [] In clinic     []  Ice     []  heat  []  Ice massage  []  Laser   []  Anodyne Position:  Location:     []  Laser with stim  []  Other:  Position:  Location:     []  Vasopneumatic Device    []  Right     []  Left  Pre-treatment girth:  Post-treatment girth:  Measured at (location):  Pressure:       [] lo [] med [] hi   Temperature: [] lo [] med [] hi   [x] Skin assessment post-treatment:  [x]intact []redness- no adverse reaction    []redness - adverse reaction:            26 min Therapeutic Exercise:  [x] See flow sheet :   Rationale: increase ROM, increase strength, and improve coordination to improve the patients ability to perform increased activity     19 min Manual Therapy:  PROM/AAROM with slight overstretch for left shoulder Flx/ABD/Scap, STM/DTM/TPR at the left infraspinatus   The manual therapy interventions were performed at a separate and distinct time from the therapeutic activities interventions. Rationale: decrease pain, increase ROM, increase tissue extensibility, and decrease trigger points to improve active mobility                With   [x] TE   [] TA   [] neuro   [] other: Patient Education: [x] Review HEP    [] Progressed/Changed HEP based on:   [] positioning   [] body mechanics   [] transfers   [] heat/ice application    [] other:      Other Objective/Functional Measures:   - Add UBE Retro  - Add Side - Side Codman's     Pain Level (0-10 scale) post treatment: 3    ASSESSMENT/Changes in Function:   - Good tolerance to added exercises    Patient will continue to benefit from skilled PT services to modify and progress therapeutic interventions, address functional mobility deficits, address ROM deficits, address strength deficits, analyze and address soft tissue restrictions, analyze and cue movement patterns, analyze and modify body mechanics/ergonomics, and address imbalance/dizziness to attain remaining goals. []  See Plan of Care  []  See progress note/recertification  []  See Discharge Summary         Progress towards goals / Updated goals:  Goals for this certification period to be accomplished in 10 treatments:  1.   Pt will report 50% improvement or better with left shoulder dysfunction to show a significant increase in ability to tolerate ADL                        Status at last note/certification:  Pt reports improvement but value is not provided              Current Status:                 2.  Pt will demonstrate PROM left shoulder Flx/ABD to 120* for good progress towards AAROM with little dificulty                         Status at last note/certification: Progressing flx 101* abd 80*              Current Status:              3.  Pt will demonstrate AAROM with finger ladder to 120* for carryover to reaching her head to allow her to fix her hair with little difficulty                        Status at last note/certification: AAROM 110* after MT               Current Status:              4.  Pt will report the ability to pull up her pants with little difficulty to return to PLOF                          Status at last note/certification:  Hard to pull up her pants without assistance              Current Status:              5.  Pt will improve FOTO score to 57 to show significant improvement for progress to good left shoulder function                         Status at last note/certification: FOTO = 34             Current Status: Progressing with FOTO = 44 11/8/22    PLAN  [x]  Upgrade activities as tolerated     [x]  Continue plan of care  []  Update interventions per flow sheet       []  Discharge due to:_  []  Other:_      Adolph Vasquez PT 11/8/2022  8:50 AM    Future Appointments   Date Time Provider Pierce Wahl   11/8/2022 12:30 PM Chago Jacobo, PT [facility]   11/11/2022 11:00 AM Chago Jacobo, PT [facility]   11/15/2022 11:45 AM Brigitte Stanley PTA [facility]   11/18/2022 11:45 AM Brigitte Stanley PTA [facility]   11/22/2022 11:45 AM Chago Jacobo, PT [facility]   11/29/2022 11:45 AM Brigitte Stanley PTA [facility]   12/2/2022 11:45 AM Brigitte Stanley, PTA [facility]   12/6/2022 11:45 AM Cynthea Sensing, PT [facility]   12/9/2022 11:45 AM Cynthea Sensing, PT [facility]   12/13/2022 11:45 AM Cynthea Sensing, PT [facility]

## 2022-11-11 ENCOUNTER — APPOINTMENT (OUTPATIENT)
Dept: PHYSICAL THERAPY | Age: 77
End: 2022-11-11
Payer: MEDICARE

## 2022-11-15 ENCOUNTER — APPOINTMENT (OUTPATIENT)
Dept: PHYSICAL THERAPY | Age: 77
End: 2022-11-15
Payer: MEDICARE

## 2022-11-18 ENCOUNTER — APPOINTMENT (OUTPATIENT)
Dept: PHYSICAL THERAPY | Age: 77
End: 2022-11-18
Payer: MEDICARE

## 2022-11-22 ENCOUNTER — APPOINTMENT (OUTPATIENT)
Dept: PHYSICAL THERAPY | Age: 77
End: 2022-11-22
Payer: MEDICARE

## 2022-11-29 ENCOUNTER — HOSPITAL ENCOUNTER (OUTPATIENT)
Dept: PHYSICAL THERAPY | Age: 77
Discharge: HOME | End: 2022-11-29
Payer: MEDICARE

## 2022-11-29 PROCEDURE — 97110 THERAPEUTIC EXERCISES: CPT

## 2022-11-29 PROCEDURE — 97112 NEUROMUSCULAR REEDUCATION: CPT

## 2022-11-29 NOTE — PROGRESS NOTES
PT DAILY TREATMENT NOTE    Patient Name: Tiffanie Mis  Date:2022  : 1945  [x]  Patient  Verified  Payor: VA MEDICARE / Plan: VA MEDICARE PART A & B / Product Type: Medicare /    In time:11:47  Out time: 12:32  Total Treatment Time (min): 45  Total Timed Codes (min): 45  1:1 Treatment Time (MC/BCBS only): 45   Visit #: 2 of 10    Treatment Dx: Left hand pain [M79.642]    SUBJECTIVE  Pain Level (0-10 scale): 4/10  Any medication changes, allergies to medications, adverse drug reactions, diagnosis change, or new procedure performed?: [x] No    [] Yes (see summary sheet for update)  Subjective functional status/changes:   [] No changes reported  \"I bumped my shoulder yesterday so I just have a little soreness. \"    OBJECTIVE    35 min Therapeutic Exercise:  [x] See flow sheet :   Rationale: increase ROM, increase strength, and improve coordination to improve the patients ability to return to prior level of physical activity. 10 min Neuromuscular Re-education:  [x]  See flow sheet :   Rationale: increase ROM, increase strength, and improve coordination  to improve the patients ability to return to prior level of physical activity. With   [] TE   [] TA   [] neuro   [] other: Patient Education: [x] Review HEP    [] Progressed/Changed HEP based on:   [] positioning   [] body mechanics   [] transfers   [] heat/ice application    [] other:      Other Objective/Functional Measures:      Pain Level (0-10 scale) post treatment: 4/10    ASSESSMENT/Changes in Function: Pt arrived in clinic reporting pain in left shoulder. Pt reports accidentally running into a cabinet causing mild pain on outer shoulder. Pt reports soreness around area when performing AROM. Due to pt being at appropriate level per typical Reverse Total Shoulder. Pt was progressed to [facility] with wand and isometrics for shoulder strengthening within tolerance. Pt reported no increased pain post tx.      Patient will continue to benefit from skilled PT services to modify and progress therapeutic interventions, address functional mobility deficits, address ROM deficits, address strength deficits, analyze and address soft tissue restrictions, and analyze and cue movement patterns to attain remaining goals. [x]  See Plan of Care  []  See progress note/recertification  []  See Discharge Summary         Progress towards goals / Updated goals:  Goals for this certification period to be accomplished in 10 treatments:  1.   Pt will report 50% improvement or better with left shoulder dysfunction to show a significant increase in ability to tolerate ADL                        Status at last note/certification:  Pt reports improvement but value is not provided              Current Status:      Pt reports 40% improvement with general shoulder function 11/29/22             2.  Pt will demonstrate PROM left shoulder Flx/ABD to 120* for good progress towards AAROM with little dificulty                         Status at last note/certification: Progressing flx 101* abd 80*              Current Status:                3.  Pt will demonstrate AAROM with finger ladder to 120* for carryover to reaching her head to allow her to fix her hair with little difficulty                        Status at last note/certification: AAROM 110* after MT               Current Status:                4.  Pt will report the ability to pull up her pants with little difficulty to return to PLOF                          Status at last note/certification:  Hard to pull up her pants without assistance              Current Status:                5.  Pt will improve FOTO score to 57 to show significant improvement for progress to good left shoulder function                         Status at last note/certification: FOTO = 34             Current Status: Progressing with FOTO = 44 11/8/22    PLAN  [x]  Upgrade activities as tolerated     [x]  Continue plan of care  []  Update interventions per flow sheet       []  Discharge due to:_  []  Other:_      Corrie Hicks, PTA 11/29/2022  8:50 AM    Future Appointments   Date Time Provider Pierce Wahl   11/29/2022 11:45 AM [facility] [place] [facility]   12/2/2022 11:45 AM Virgen Matos, KOLBY [facility]   12/6/2022 11:45 AM Kenia Agudelo, PT [facility]   12/9/2022 11:45 AM Kenia Agudelo, PT [facility]   12/13/2022 11:45 AM Kenia Agudelo, PT [facility]

## 2022-12-02 ENCOUNTER — APPOINTMENT (OUTPATIENT)
Dept: PHYSICAL THERAPY | Age: 77
End: 2022-12-02

## 2022-12-16 ENCOUNTER — APPOINTMENT (OUTPATIENT)
Dept: PHYSICAL THERAPY | Age: 77
End: 2022-12-16

## 2023-01-24 ENCOUNTER — HOSPITAL ENCOUNTER (OUTPATIENT)
Dept: PHYSICAL THERAPY | Age: 78
Discharge: HOME | End: 2023-01-24
Payer: MEDICARE

## 2023-01-24 PROCEDURE — 97140 MANUAL THERAPY 1/> REGIONS: CPT

## 2023-01-24 PROCEDURE — 97110 THERAPEUTIC EXERCISES: CPT

## 2023-01-24 PROCEDURE — 97161 PT EVAL LOW COMPLEX 20 MIN: CPT

## 2023-01-24 NOTE — PROGRESS NOTES
[facility] at [address] [facility], [facility] [place] [address] [facility]  Phone: [phone]      Fax:  [phone]    Plan of Care/ Statement of Necessity for Physical Therapy Services  Patient name: Kelin Peña Start of Care: 2023   Referral source: Sahara Prado MD : 1945    Medical Diagnosis: Left shoulder pain [M25.512]  Payor: VA MEDICARE / Plan: VA MEDICARE PART A & B / Product Type: Medicare /  Onset Date:23    Treatment Diagnosis: Left Shoulder Pain   Prior Hospitalization: see medical history Provider#: [id number]   Medications: Verified on Patient summary List    Comorbidities: Depressing, OA, HTN   Prior Level of Function: Left shoulder was fine with no trouble. Able to Carry twin grand babies with one in each arm      The Plan of Care and following information is based on the information from the initial evaluation. Assessment/ key information: Patient is a 68 y.o. female referred to PT with the above Dx. Patient seen today for c/o left shoulder pain after having a proximal humerus fracture on 23 after a fall on her out stretched left UE. Pain noted at the upper arm. Using heat at home      Patient presents to PT with decreased strength, decreased flexibility, and decreased mobility of the left shoulder. Painful palpation of the left infraspinatus/ Pec/   Patient s/s appear to be consistent w/ diagnosis. Patient demonstrates the potential to make functional gains within a reasonable time frame and will benefit from skilled PT to address impairments and improve functional mobility and strength for an improved quality of life.   Fall Risk Assessment: Patient demonstrates a low Fall Risk       Mechanism of Injury: [address] Left   Evaluation Complexity History HIGH Complexity :3+ comorbidities / personal factors will impact the outcome/ POC ; Examination LOW Complexity : 1-2 Standardized tests and measures addressing body structure, function, activity limitation and / or participation in recreation  ;Presentation LOW Complexity : Stable, uncomplicated  ;Clinical Decision Making MEDIUM Complexity : FOTO score of 26-74  Overall Complexity Rating: LOW   Problem List: pain affecting function, decrease ROM, decrease strength, decrease ADL/ functional abilitiies, decrease activity tolerance, decrease flexibility/ joint mobility, decrease transfer abilities, and other FOTO = 44    Treatment Plan may include any combination of the following: Therapeutic exercise, Neuromuscular reeducation, Manual therapy, Therapeutic activity, Self care/home management, Electric stim unattended , Ultrasound, Mechanical traction, Electric stim attended, Needle insertion w/o injection (1 or 2 muscles), and Needle insertion w/o injection (3+ muscles)  Patient / Family readiness to learn indicated by: asking questions, trying to perform skills, and interest  Persons(s) to be included in education: patient (P)  Barriers to Learning/Limitations: None  Patient Goal (s): More mobility and comfort of the left UE. Want to get back to sleeping in her bed  Patient Self Reported Health Status: good  Rehabilitation Potential: good    Short Term Goals: To be accomplished in 5 treatments:  1. Pt will be compliant and independent with HEP in order to facilitate PT sessions and aid with self management             Eval Status:  Initiated             Current Status:  2. Pt to tolerate 30 min or more of TE and/or Interventions w/o increased s/s             Eval Status:  Initiated             Current Status:     Long Term Goals: To be accomplished in 10 treatments:  1. Pt will report 50% improvement or better with left shoulder dysfunction to show a significant increase in ability to tolerate ADL             Eval Status:  Initiated             Current Status:  2.   Pt will demonstrate MMT left shoulder Flx/ABD/ER at 4+/5 for carryover to lifting better to perform usual daily activity with little difficulty               Eval Status: Weakness left shoulder Flx 4, ABD 3, ER 3+              Current Status:  3. Pt will demonstrate AROM Left Shoulder Flx/ABD to 110*, ADD to 20* and IR to 45*               Eval Status:  AROM Flx/ABD 90, E_IR 25*,              Current Status:  4. Pt will demonstrate reach to overhead shelf left UE with 2# or more for carryover to lifting dishes into the microwave when preparing meals                  Eval Status:  Hard to put food in and out of the microwave             Current Status:  5. Pt will improve FOTO score to 61 in 15 visits to show significant improvement for progress to good shoulder function             Eval Status: FOTO = 44             Current Status:     Frequency / Duration: Patient to be seen 2-3 times per week for 10 treatments. Patient/ Caregiver education and instruction: Diagnosis, prognosis, self care, activity modification, and exercises   [x]  Plan of care has been reviewed with PTA    Certification Period: 1/24/23 - 4/22/23  Winter Conklin, PT 1/24/2023 11:46 AM  _____________________________________________________________________  I certify that the above Therapy Services are being furnished while the patient is under my care. I agree with the treatment plan and certify that this therapy is necessary.     [de-identified] Signature:____________Date:_________TIME:________     Ade Cho MD  ** Signature, Date and Time must be completed for valid certification **    Please sign and return to [facility] at [address]  [place], [facility] [address]  Phone: [phone]      Fax:  [phone]

## 2023-01-24 NOTE — PROGRESS NOTES
PT DAILY TREATMENT NOTE/SHOULDER EVAL     Patient Name: Cammy Berrios  Date:2023  : 1945  [x]  Patient  Verified  Payor: VA MEDICARE / Plan: VA MEDICARE PART A & B / Product Type: Medicare /    In time:1140  Out time:1230  Total Treatment Time (min): 50  Visit #: 1 of 24    Medicare/BCBS Only   Total Timed Codes (min):  30 1:1 Treatment Time:  46       Treatment Area: Left shoulder pain [M25.512]      SUBJECTIVE  Pain Level (0-10 scale): 2  []constant [x]intermittent []improving []worsening []no change since onset     Any medication changes, allergies to medications, adverse drug reactions, diagnosis change, or new procedure performed?: [x] No    [] Yes (see summary sheet for update)  Subjective functional status/changes:       Subjective: Patient is a 68 y.o. female referred to PT with the above Dx. Patient seen today for c/o left shoulder pain after having a proximal humerus fracture on 23 after a fall on her out stretched left UE. Pain noted at the upper arm. Using heat at home     Patient presents to PT with decreased strength, decreased flexibility, and decreased mobility of the left shoulder. Painful palpation of the left infraspinatus/ Pec/   Patient s/s appear to be consistent w/ diagnosis. Patient demonstrates the potential to make functional gains within a reasonable time frame and will benefit from skilled PT to address impairments and improve functional mobility and strength for an improved quality of life. Fall Risk Assessment: Patient demonstrates a low Fall Risk      Mechanism of Injury: [address]      Comorbidities: Depressing, OA, HTN   Prior Level of Function: Left shoulder was fine with no trouble. Able to Carry twin grand babies with one in each arm                              FOTO: 40 / 64 in 13 visits    Goal: More mobility and comfort of the left UE. Want to get back to sleeping in her bed    Health Status: Good      What type of work do you do? N/A    Living Situation/Domestic Life: Lives at with  and 2 sons  Mobility: (I)  Self Care (I)    What type of daily activities/hobbies? Taking care of  and son with dementia    Limitations to Activity/Recreation/PLOF: Not able to lean on the left arm, Drinking with left hand, hard to put things in and out of the microwave, hard to push buttons with the left hand, shaking laundry to fold, decreased , cant rely on the arm with lifting. Barriers: [x]pain []financial []time []transportation []other    Motivation: High    FABQ Score: []low []elevate    Cognition: A & O x 3    Other:    Risk For Falls:   []No  [x]low []elevate           OBJECTIVE/EXAMINATION  Posture:  - (B) Fwd Shoulder  - Tx Kyphosis  - TTP Left Pec/Anterior, Pst, Lateral Deltoid/Infraspinatus/Teres Minor  - Weakness left shoulder as noted below  - - TTP Tx paraspinal/UT/Lvtr/splenius       AROM PROM Strength Pain? ?    Right Left Right Left Right Left    Shoulder Flx WFL 90   5- 4    Shoulder Ext WFL 37   5 5-    Shoulder ABD WFL 90   4- 3    Shoulder ADD  10   5 4-4+    Shoulder IR  25   5- 4    Shoulder ER  52   5- 3+         19, 18, 20 13, 11, 15                     20 min [x]Eval                  []Re-Eval         16 min Therapeutic Exercise:  [x] See flow sheet : Develop and instruct HEP   Rationale: increase ROM, increase strength, and improve coordination to improve the patients ability to Initiate HEP and improve daily function     10 min Manual Therapy:  STM/DTM left infraspinatus/pec   The manual therapy interventions were performed at a separate and distinct time from the therapeutic activities interventions.   Rationale: decrease pain, increase ROM, increase tissue extensibility, and decrease trigger points to improve daily activity                                                                   With   [x] TE   [] TA   [] neuro   [] other: Patient Education: [x] Review HEP    [] Progressed/Changed HEP based on:   [] positioning   [] body mechanics   [] transfers   [] heat/ice application    [] other:       Other Objective/Functional Measures:      Access Code: [email or web address]  URL: [email or web address]  Date: 01/24/2023  Prepared by: Saulo Lind    Exercises  Seated Forearm Pronation and Supination AROM - 2 x daily - 7 x weekly - 3 sets - 10 reps  Seated Cervical Sidebending AROM - 2 x daily - 7 x weekly - 10 reps  Seated Scapular Retraction - 2 x daily - 7 x weekly - 3 sets - 10 reps  Seated Shoulder Shrugs - 2 x daily - 7 x weekly - 3 sets - 10 reps  Standing Shoulder Posterior Capsule Stretch - 1 x daily - 7 x weekly - 10 reps - 3 sets  Standing Shoulder Scaption - 1 x daily - 7 x weekly - 10 reps - 3 sets  Seated Shoulder Horizontal Abduction - Palms Down - 1 x daily - 7 x weekly - 10 reps - 3 sets  Shoulder Overhead Press in Abduction with Dumbbells - 1 x daily - 7 x weekly - 10 reps - 3 sets  Seated Single Arm Chest Press - 1 x daily - 7 x weekly - 10 reps - 3 sets  Pec Minor Stretch - 1 x daily - 7 x weekly - 10 reps - 3 sets    Pain Level (0-10 scale) post treatment: 2     ASSESSMENT/Changes in Function:    - Pt demo understanding of the HEP      [x]  See Plan of Care  []  See progress note/recertification  []  See Discharge Summary         Progress towards goals / Updated goals:  Short Term Goals: To be accomplished in 5 treatments:  1. Pt will be compliant and independent with HEP in order to facilitate PT sessions and aid with self management   Eval Status:  Initiated   Current Status:  2. Pt to tolerate 30 min or more of TE and/or Interventions w/o increased s/s   Eval Status:  Initiated   Current Status:    Long Term Goals: To be accomplished in 10 treatments:  1. Pt will report 50% improvement or better with left shoulder dysfunction to show a significant increase in ability to tolerate ADL   Eval Status:  Initiated   Current Status:  2.   Pt will demonstrate MMT left shoulder Flx/ABD/ER at 4+/5 for carryover to lifting better to perform usual daily activity with little difficulty     Eval Status: Weakness left shoulder Flx 4, ABD 3, ER 3+    Current Status:  3. Pt will demonstrate AROM Left Shoulder Flx/ABD to 110*, ADD to 20* and IR to 45*     Eval Status:  AROM Flx/ABD 90, E_IR 25*,    Current Status:  4. Pt will demonstrate reach to overhead shelf left UE with 2# or more for carryover to lifting dishes into the microwave when preparing meals        Eval Status:  Hard to put food in and out of the microwave   Current Status:  5.   Pt will improve FOTO score to 61 in 15 visits to show significant improvement for progress to good shoulder function   Eval Status: FOTO = 44   Current Status:      PLAN  [x]  Upgrade activities as tolerated     [x]  Continue plan of care  []  Update interventions per flow sheet       []  Discharge due to:_  []  Other:_       Maria T Hankins, PT 1/24/2023  11:51 AM

## 2023-02-10 ENCOUNTER — TELEPHONE (OUTPATIENT)
Dept: PHYSICAL THERAPY | Age: 78
End: 2023-02-10

## 2023-02-14 ENCOUNTER — TELEPHONE (OUTPATIENT)
Facility: HOSPITAL | Age: 78
End: 2023-02-14